# Patient Record
Sex: FEMALE | Race: WHITE
[De-identification: names, ages, dates, MRNs, and addresses within clinical notes are randomized per-mention and may not be internally consistent; named-entity substitution may affect disease eponyms.]

---

## 2021-07-19 ENCOUNTER — HOSPITAL ENCOUNTER (OUTPATIENT)
Dept: HOSPITAL 63 - RAD | Age: 46
End: 2021-07-19
Attending: PHYSICIAN ASSISTANT
Payer: COMMERCIAL

## 2021-07-19 DIAGNOSIS — R05: Primary | ICD-10-CM

## 2021-07-19 PROCEDURE — 71046 X-RAY EXAM CHEST 2 VIEWS: CPT

## 2021-07-19 NOTE — RAD
INDICATION: Shortness of breath



COMPARISON: None.



FINDINGS:



2 view of chest obtained.

No focal airspace consolidation.  

Cardiomediastinal contour unremarkable.



No acute osseous abnormality.





IMPRESSION:



*  No focal airspace consolidation or edema.



Electronically signed by: Gee Fairbanks MD (7/19/2021 2:31 PM) [id number]

## 2021-07-24 ENCOUNTER — HOSPITAL ENCOUNTER (EMERGENCY)
Dept: HOSPITAL 63 - ER | Age: 46
Discharge: ANOTHER HOSPITAL | End: 2021-07-24
Payer: COMMERCIAL

## 2021-07-24 VITALS — BODY MASS INDEX: 23.05 KG/M2 | WEIGHT: 130.07 LBS | HEIGHT: 63 IN

## 2021-07-24 VITALS — SYSTOLIC BLOOD PRESSURE: 153 MMHG | DIASTOLIC BLOOD PRESSURE: 95 MMHG

## 2021-07-24 DIAGNOSIS — I10: ICD-10-CM

## 2021-07-24 DIAGNOSIS — I21.3: Primary | ICD-10-CM

## 2021-07-24 LAB
ALBUMIN SERPL-MCNC: 3.9 G/DL (ref 3.4–5)
ALBUMIN/GLOB SERPL: 1.1 {RATIO} (ref 1–1.7)
ALP SERPL-CCNC: 95 U/L (ref 46–116)
ALT SERPL-CCNC: 49 U/L (ref 14–59)
ANION GAP SERPL CALC-SCNC: 13 MMOL/L (ref 6–14)
AST SERPL-CCNC: 36 U/L (ref 15–37)
BASOPHILS # BLD AUTO: 0 X10^3/UL (ref 0–0.2)
BASOPHILS NFR BLD: 1 % (ref 0–3)
BILIRUB SERPL-MCNC: 0.5 MG/DL (ref 0.2–1)
BUN/CREAT SERPL: 7 (ref 6–20)
CA-I SERPL ISE-MCNC: 6 MG/DL (ref 7–20)
CALCIUM SERPL-MCNC: 9.5 MG/DL (ref 8.5–10.1)
CHLORIDE SERPL-SCNC: 102 MMOL/L (ref 98–107)
CO2 SERPL-SCNC: 25 MMOL/L (ref 21–32)
CREAT SERPL-MCNC: 0.9 MG/DL (ref 0.6–1)
EOSINOPHIL NFR BLD: 0 X10^3/UL (ref 0–0.7)
EOSINOPHIL NFR BLD: 1 % (ref 0–3)
ERYTHROCYTE [DISTWIDTH] IN BLOOD BY AUTOMATED COUNT: 14 % (ref 11.5–14.5)
GFR SERPLBLD BASED ON 1.73 SQ M-ARVRAT: 68.3 ML/MIN
GLOBULIN SER-MCNC: 3.4 G/DL (ref 2.2–3.8)
GLUCOSE SERPL-MCNC: 145 MG/DL (ref 70–99)
HCT VFR BLD CALC: 49.6 % (ref 36–47)
HGB BLD-MCNC: 16.9 G/DL (ref 12–15.5)
LYMPHOCYTES # BLD: 1.8 X10^3/UL (ref 1–4.8)
LYMPHOCYTES NFR BLD AUTO: 44 % (ref 24–48)
MAGNESIUM SERPL-MCNC: 1.8 MG/DL (ref 1.8–2.4)
MCH RBC QN AUTO: 35 PG (ref 25–35)
MCHC RBC AUTO-ENTMCNC: 34 G/DL (ref 31–37)
MCV RBC AUTO: 101 FL (ref 79–100)
MONO #: 0.4 X10^3/UL (ref 0–1.1)
MONOCYTES NFR BLD: 10 % (ref 0–9)
NEUT #: 1.8 X10^3UL (ref 1.8–7.7)
NEUTROPHILS NFR BLD AUTO: 44 % (ref 31–73)
PLATELET # BLD AUTO: 136 X10^3/UL (ref 140–400)
POTASSIUM SERPL-SCNC: 3.9 MMOL/L (ref 3.5–5.1)
PROT SERPL-MCNC: 7.3 G/DL (ref 6.4–8.2)
RBC # BLD AUTO: 4.91 X10^6/UL (ref 3.5–5.4)
SODIUM SERPL-SCNC: 140 MMOL/L (ref 136–145)
WBC # BLD AUTO: 4 X10^3/UL (ref 4–11)

## 2021-07-24 PROCEDURE — 71045 X-RAY EXAM CHEST 1 VIEW: CPT

## 2021-07-24 PROCEDURE — 85730 THROMBOPLASTIN TIME PARTIAL: CPT

## 2021-07-24 PROCEDURE — 85610 PROTHROMBIN TIME: CPT

## 2021-07-24 PROCEDURE — 96372 THER/PROPH/DIAG INJ SC/IM: CPT

## 2021-07-24 PROCEDURE — 84484 ASSAY OF TROPONIN QUANT: CPT

## 2021-07-24 PROCEDURE — 83735 ASSAY OF MAGNESIUM: CPT

## 2021-07-24 PROCEDURE — 36415 COLL VENOUS BLD VENIPUNCTURE: CPT

## 2021-07-24 PROCEDURE — 80053 COMPREHEN METABOLIC PANEL: CPT

## 2021-07-24 PROCEDURE — 85025 COMPLETE CBC W/AUTO DIFF WBC: CPT

## 2021-07-24 PROCEDURE — 85379 FIBRIN DEGRADATION QUANT: CPT

## 2021-07-24 PROCEDURE — 99285 EMERGENCY DEPT VISIT HI MDM: CPT

## 2021-07-24 PROCEDURE — 83880 ASSAY OF NATRIURETIC PEPTIDE: CPT

## 2021-07-24 PROCEDURE — 93005 ELECTROCARDIOGRAM TRACING: CPT

## 2021-07-24 PROCEDURE — 96374 THER/PROPH/DIAG INJ IV PUSH: CPT

## 2021-07-24 NOTE — RAD
XR CHEST 1V



History: Reason: stemi / Spl. Instructions:  / History: 



Comparison: July 19, 2021



Findings:

No consolidation or pleural effusion. Normal heart size. No pneumothorax.



Impression: 

1.  No acute cardiopulmonary process.



Electronically signed by: Kareem Muller DO (7/24/2021 2:14 PM) [id number]

## 2021-07-24 NOTE — PHYS DOC
General Adult


EDM:


Chief Complaint:  CHEST PAIN





HPI:


HPI:





Patient is a  45-year-old female coming in for 1 hour of chest heaviness.  

Patient states she was sitting when it started.  Not had symptoms like this 

before.  Patient states she is a history of hypertension and is compliant on 

lisinopril.  Has a history of tobacco use, denies any alcohol or drugs.  Patient

tested positive for COVID-19 4 days ago, but has not been having any symptoms 

other than nonproductive cough for the past couple days.





Review of Systems:


Review of Systems:


All other systems within normal limits except for as noted in the HPI





Physical Exam:


PE:





Constitutional: Well developed, well nourished, no acute distress, non-toxic 

appearance. []


HENT: Normocephalic, atraumatic, bilateral external ears normal, oropharynx 

moist, no oral exudates, nose normal. []


Eyes: PERRLA, EOMI, conjunctiva normal, no discharge. [] 


Neck: Normal range of motion, no tenderness, supple, no stridor. [] 


Cardiovascular:Heart rate regular rhythm, no murmur []


Lungs & Thorax:  Bilateral breath sounds clear to auscultation []


Abdomen: Bowel sounds normal, soft, no tenderness, no masses, no pulsatile 

masses. [] 


Skin: Warm, dry, no erythema, no rash. [] 


Back: No tenderness, no CVA tenderness. [] 


Extremities: No tenderness, no cyanosis, no clubbing, ROM intact, no edema. [] 


Neurologic: Alert and oriented X 3, normal motor function, normal sensory 

function, no focal deficits noted. []


Psychologic: Affect normal, judgement normal, mood normal. []





Current Patient Data:


Labs:


Constitutional: Well developed, well nourished, no acute distress, non-toxic 

appearance. []


HENT: Normocephalic, atraumatic, bilateral external ears normal,  nose normal. 

[]


Eyes: PERRLA, conjunctiva normal, no discharge. [] 


Neck: No rigidity, supple, no stridor. [] 


Cardiovascular: Regular rate and rhythm, brisk cap refill []


Lungs & Thorax: Non labored symmetric respirations, no tachypnea or respiratory 

distress []


Abdomen: Soft, nondistended.


Skin: Warm, dry, no erythema, no rash. [] 


Back: Unremarkable


Extremities: No deformities, range of motion grossly intact, no lower extremity 

edema [] 


Neurologic: Alert and oriented X 3, no focal deficits noted. []


Psychologic: Affect normal, judgement normal, mood normal. []





EKG:


EK: ST elevation in in leads II, III, aVF, ST depression in 1 and aVL


1358: Continued ST elevation in inferior leads with depressions in 1 and aVL.  

No significant change from previous EKG []





Radiology/Procedures:


Radiology/Procedures:


[facility] [address], [place] [address] [phone]


                                        


                                 IMAGING REPORT





                                     Signed





PATIENT: JONATHON YOUNG ACCOUNT: [id number]     MRN#: [id number]


: 1975           LOCATION: ER              AGE: 45


SEX: F                    EXAM DT: 21         ACCESSION#: [id number]


STATUS: REG ER            ORD. PHYSICIAN: REY DESIR MD


REASON: stemi


PROCEDURE: PORTABLE CHEST 1V





XR CHEST 1V





History: Reason: stemi / Spl. Instructions:  / History: 





Comparison: 2021





Findings:


No consolidation or pleural effusion. Normal heart size. No pneumothorax.





Impression: 


1.  No acute cardiopulmonary process.





Electronically signed by: Kareem Muller DO (2021 2:14 PM) [id number]














DICTATED AND SIGNED BY:     KAREEM MULLER DO


DATE:     21 1414





CC: REY DESIR MD; JORDYN KIRK ~MTH0 0


[]





Heart Score:


C/O Chest Pain:  N/A


Risk Factors:


Risk Factors:  DM, Current or recent (<one month) smoker, HTN, HLP, family 

history of CAD, obesity.


Risk Scores:


Score 0 - 3:  2.5% MACE over next 6 weeks - Discharge Home


Score 4 - 6:  20.3% MACE over next 6 weeks - Admit for Clinical Observation


Score 7 - 10:  72.7% MACE over next 6 weeks - Early Invasive Strategies





Course & Med Decision Making:


Course & Med Decision Making


STEMI activation at 1336.  Discussed case with Dr. Severino who will take patient

 to Cath Lab. patient admitted to hospitalist, Dr. Dick.





Rory Disclaimer:


Dragon Disclaimer:


This electronic medical record was generated, in whole or in part, using a voice

 recognition dictation system.





Departure


Departure:


Impression:  


   Primary Impression:  


   STEMI (ST elevation myocardial infarction)


Disposition:  [facility]


Condition:  CRITICAL


Referrals:  


JORDYN KIRK (PCP)











REY DESIR MD            2021 13:55

## 2021-07-26 VITALS — DIASTOLIC BLOOD PRESSURE: 112 MMHG | SYSTOLIC BLOOD PRESSURE: 145 MMHG

## 2021-07-26 NOTE — EKG
[facility] [address], [place] [address]

Test Date:    2021               Test Time:    13:23:11

Pat Name:     JONATHON YOUNG           Department:   

Patient ID:   [id number]           Room:          

Gender:       F                        Technician:   IVAN

:          1975               Requested By: REY DESIR

Order Number: [id number]            Reading MD:     

                                 Measurements

Intervals                              Axis          

Rate:         68                       P:            27

WY:           130                      QRS:          63

QRSD:         82                       T:            78

QT:           400                                    

QTc:          426                                    

                           Interpretive Statements

SINUS RHYTHM

CONSIDER RIGHT VENTRICULAR HYPERTROPHY

POSSIBLY ABNORMAL ECG

RI6.02

No previous ECG available for comparison

## 2021-07-27 ENCOUNTER — HOSPITAL ENCOUNTER (EMERGENCY)
Dept: HOSPITAL 63 - ER | Age: 46
Discharge: HOME | End: 2021-07-27
Payer: COMMERCIAL

## 2021-07-27 VITALS — SYSTOLIC BLOOD PRESSURE: 117 MMHG | DIASTOLIC BLOOD PRESSURE: 80 MMHG

## 2021-07-27 VITALS — WEIGHT: 156.53 LBS | HEIGHT: 62.5 IN | BODY MASS INDEX: 28.08 KG/M2

## 2021-07-27 DIAGNOSIS — I25.2: ICD-10-CM

## 2021-07-27 DIAGNOSIS — U07.1: Primary | ICD-10-CM

## 2021-07-27 DIAGNOSIS — E83.42: ICD-10-CM

## 2021-07-27 DIAGNOSIS — Z98.890: ICD-10-CM

## 2021-07-27 DIAGNOSIS — E87.6: ICD-10-CM

## 2021-07-27 LAB
ALBUMIN SERPL-MCNC: 3.2 G/DL (ref 3.4–5)
ALBUMIN/GLOB SERPL: 0.9 {RATIO} (ref 1–1.7)
ALP SERPL-CCNC: 76 U/L (ref 46–116)
ALT SERPL-CCNC: 32 U/L (ref 14–59)
ANION GAP SERPL CALC-SCNC: 13 MMOL/L (ref 6–14)
APTT PPP: YELLOW S
AST SERPL-CCNC: 27 U/L (ref 15–37)
BACTERIA #/AREA URNS HPF: 0 /HPF
BASOPHILS # BLD AUTO: 0 X10^3/UL (ref 0–0.2)
BASOPHILS NFR BLD: 0 % (ref 0–3)
BILIRUB SERPL-MCNC: 0.5 MG/DL (ref 0.2–1)
BILIRUB UR QL STRIP: (no result)
BUN/CREAT SERPL: 10 (ref 6–20)
CA-I SERPL ISE-MCNC: 8 MG/DL (ref 7–20)
CALCIUM SERPL-MCNC: 8.5 MG/DL (ref 8.5–10.1)
CHLORIDE SERPL-SCNC: 105 MMOL/L (ref 98–107)
CO2 SERPL-SCNC: 22 MMOL/L (ref 21–32)
CREAT SERPL-MCNC: 0.8 MG/DL (ref 0.6–1)
EOSINOPHIL NFR BLD: 0 X10^3/UL (ref 0–0.7)
EOSINOPHIL NFR BLD: 1 % (ref 0–3)
ERYTHROCYTE [DISTWIDTH] IN BLOOD BY AUTOMATED COUNT: 13.8 % (ref 11.5–14.5)
FIBRINOGEN PPP-MCNC: CLEAR MG/DL
GFR SERPLBLD BASED ON 1.73 SQ M-ARVRAT: 78.3 ML/MIN
GLOBULIN SER-MCNC: 3.4 G/DL (ref 2.2–3.8)
GLUCOSE SERPL-MCNC: 92 MG/DL (ref 70–99)
GLUCOSE UR STRIP-MCNC: (no result) MG/DL
HCT VFR BLD CALC: 42.1 % (ref 36–47)
HGB BLD-MCNC: 14.5 G/DL (ref 12–15.5)
LYMPHOCYTES # BLD: 0.9 X10^3/UL (ref 1–4.8)
LYMPHOCYTES NFR BLD AUTO: 11 % (ref 24–48)
MAGNESIUM SERPL-MCNC: 1.7 MG/DL (ref 1.8–2.4)
MCH RBC QN AUTO: 35 PG (ref 25–35)
MCHC RBC AUTO-ENTMCNC: 34 G/DL (ref 31–37)
MCV RBC AUTO: 100 FL (ref 79–100)
MONO #: 0.6 X10^3/UL (ref 0–1.1)
MONOCYTES NFR BLD: 8 % (ref 0–9)
MONONUCLEOSIS PATIENT: NEGATIVE
NEUT #: 6.7 X10^3UL (ref 1.8–7.7)
NEUTROPHILS NFR BLD AUTO: 81 % (ref 31–73)
NITRITE UR QL STRIP: (no result)
PLATELET # BLD AUTO: 134 X10^3/UL (ref 140–400)
POTASSIUM SERPL-SCNC: 3.3 MMOL/L (ref 3.5–5.1)
PROT SERPL-MCNC: 6.6 G/DL (ref 6.4–8.2)
RBC # BLD AUTO: 4.19 X10^6/UL (ref 3.5–5.4)
RBC #/AREA URNS HPF: 0 /HPF (ref 0–2)
SODIUM SERPL-SCNC: 140 MMOL/L (ref 136–145)
SP GR UR STRIP: 1.02
SQUAMOUS #/AREA URNS LPF: (no result) /LPF
UROBILINOGEN UR-MCNC: 0.2 MG/DL
WBC # BLD AUTO: 8.3 X10^3/UL (ref 4–11)
WBC #/AREA URNS HPF: 0 /HPF (ref 0–4)

## 2021-07-27 PROCEDURE — 93005 ELECTROCARDIOGRAM TRACING: CPT

## 2021-07-27 PROCEDURE — 82553 CREATINE MB FRACTION: CPT

## 2021-07-27 PROCEDURE — 80053 COMPREHEN METABOLIC PANEL: CPT

## 2021-07-27 PROCEDURE — 83735 ASSAY OF MAGNESIUM: CPT

## 2021-07-27 PROCEDURE — 96360 HYDRATION IV INFUSION INIT: CPT

## 2021-07-27 PROCEDURE — 81001 URINALYSIS AUTO W/SCOPE: CPT

## 2021-07-27 PROCEDURE — 83605 ASSAY OF LACTIC ACID: CPT

## 2021-07-27 PROCEDURE — 85651 RBC SED RATE NONAUTOMATED: CPT

## 2021-07-27 PROCEDURE — 96361 HYDRATE IV INFUSION ADD-ON: CPT

## 2021-07-27 PROCEDURE — 85025 COMPLETE CBC W/AUTO DIFF WBC: CPT

## 2021-07-27 PROCEDURE — 36415 COLL VENOUS BLD VENIPUNCTURE: CPT

## 2021-07-27 PROCEDURE — 87040 BLOOD CULTURE FOR BACTERIA: CPT

## 2021-07-27 PROCEDURE — 86308 HETEROPHILE ANTIBODY SCREEN: CPT

## 2021-07-27 PROCEDURE — 71045 X-RAY EXAM CHEST 1 VIEW: CPT

## 2021-07-27 PROCEDURE — U0003 INFECTIOUS AGENT DETECTION BY NUCLEIC ACID (DNA OR RNA); SEVERE ACUTE RESPIRATORY SYNDROME CORONAVIRUS 2 (SARS-COV-2) (CORONAVIRUS DISEASE [COVID-19]), AMPLIFIED PROBE TECHNIQUE, MAKING USE OF HIGH THROUGHPUT TECHNOLOGIES AS DESCRIBED BY CMS-2020-01-R: HCPCS

## 2021-07-27 PROCEDURE — 84484 ASSAY OF TROPONIN QUANT: CPT

## 2021-07-27 PROCEDURE — 99285 EMERGENCY DEPT VISIT HI MDM: CPT

## 2021-07-27 PROCEDURE — 86140 C-REACTIVE PROTEIN: CPT

## 2021-07-27 NOTE — RAD
Chest AP portable at 1919:



Reason for examination: Fever.



Comparison is made to previous study dated 7/24/2021.



The heart size is normal. Mediastinum is unremarkable. Lung fields are clear. No acute bony abnormali
ties are seen.



Impression:



No acute cardiopulmonary disease.



Electronically signed by: Nayla Lee MD (7/27/2021 8:03 PM) [email or web address]

## 2021-07-27 NOTE — PHYS DOC
Past History


Past Surgical History:  





General Adult


EDM:


Chief Complaint:  FEVER





HPI:


HPI:





Patient is a [age] year old [sex] who presents with []





Review of Systems:


Review of Systems:


Constitutional:  Denies fever or chills 


Eyes:  Denies change in visual acuity 


HENT:  Denies nasal congestion or sore throat 


Respiratory:  Denies cough or shortness of breath 


Cardiovascular:  Denies chest pain or edema 


GI:  Denies abdominal pain, nausea, vomiting, bloody stools or diarrhea 


: Denies dysuria 


Musculoskeletal:  Denies back pain or joint pain 


Integument:  Denies rash 


Neurologic:  Denies headache, focal weakness or sensory changes 


Endocrine:  Denies polyuria or polydipsia 


Lymphatic:  Denies swollen glands 


Psychiatric:  Denies depression or anxiety





Current Medications:


Current Meds:





Current Medications








 Medications


  (Trade)  Dose


 Ordered  Sig/Teodoro  Start Time


 Stop Time Status Last Admin


Dose Admin


 


 Acetaminophen


  (Tylenol)  500 mg  1X  ONCE  21 19:15


 21 19:16 DC 21 19:32


500 MG


 


 Sodium Chloride  1,000 ml @ 


 1,000 mls/hr  1X  ONCE  21 19:15


 21 20:14  21 19:43


1,000 MLS/HR











Allergies:


Allergies:





Allergies








Coded Allergies Type Severity Reaction Last Updated Verified


 


  No Known Drug Allergies    21 No











Physical Exam:


PE:





Constitutional: Well developed, well nourished, no acute distress, non-toxic 

appearance. []


HENT: Normocephalic, atraumatic, bilateral external ears normal, oropharynx 

moist, no oral exudates, nose normal. []


Eyes: PERRLA, EOMI, conjunctiva normal, no discharge. [] 


Neck: Normal range of motion, no tenderness, supple, no stridor. [] 


Cardiovascular:Heart rate regular rhythm, no murmur []


Lungs & Thorax:  Bilateral breath sounds clear to auscultation []


Abdomen: Bowel sounds normal, soft, no tenderness, no masses, no pulsatile m

asses. [] 


Skin: Warm, dry, no erythema, no rash. [] 


Back: No tenderness, no CVA tenderness. [] 


Extremities: No tenderness, no cyanosis, no clubbing, ROM intact, no edema. [] 


Neurologic: Alert and oriented X 3, normal motor function, normal sensory 

function, no focal deficits noted. []


Psychologic: Affect normal, judgement normal, mood normal. []





EKG:


EKG:


@1927 NSR at 97bpm, NO ST elevation, QRS 78ms, QT/QTc 334/428ms, t wave 

inversion III





Radiology/Procedures:


Radiology/Procedures:


PROCEDURE: CHEST AP ONLY





Chest AP portable at 1919:





Reason for examination: Fever.





Comparison is made to previous study dated 2021.





The heart size is normal. Mediastinum is unremarkable. Lung fields are clear. No

 acute bony abnormalities are seen.





Impression:





No acute cardiopulmonary disease.





Electronically signed by: Nayla Lee MD (2021 8:03 PM) MIRIAM-CHOW





Heart Score:


C/O Chest Pain:  N/A





Course & Med Decision Making:


Course & Med Decision Making


Pertinent Labs and Imaging studies reviewed. (See chart for details)





[]





Dragon Disclaimer:


Dragon Disclaimer:


This electronic medical record was generated, in whole or in part, using a voice

 recognition dictation system.





Departure


Departure:


Impression:  


   Primary Impression:  


   Fever


   Qualified Codes:  R50.9 - Fever, unspecified


   Additional Impressions:  


   Suspected 2019 novel coronavirus infection


   History of ST elevation myocardial infarction (STEMI)


   Hypokalemia


   Hypomagnesemia


Disposition:   HOME / SELF CARE / HOMELESS


Condition:  STABLE


Referrals:  


JORDYN KIRK (PCP)








RUBIO CLEMENTS MD


Patient Instructions:  Fever, Adult, Easy-to-Read, Hypokalemia, Hypomagnesemia, 

Potassium Content of Foods





Additional Instructions:  


You have been tested for or diagnosed with COVID-19. It is an infection caused 

by a new type 





of coronavirus. COVID-19 will cause cold-like or mild flu symptoms in most. It 

can cause 


more severe symptoms like problems breathing in some.





There is no treatment for COVID-19. The body will clear the infection over time.

 Self-care 


will help to ease discomfort.





Steps to Take:


Self-Care


Rest as needed. Healthy habits may help you feel better. Steps include:





Choose healthy foods including fruits and vegetables. Drink water throughout the

 day.


Get plenty of sleep each night.


If you smoke, try to quit. It may ease breathing.


Avoid alcohol.


Keep Others Healthy


The virus can spread to others. Droplets are released every time you sneeze or 

cough. The 


droplets can get into the mouth, nose, or eyes of people near you and lead to 

infection. To 


lower the chances of spreading COVID-19 to others:





Stay at home until your doctor has said it is safe to leave. If you tested 

positive this 


will mean staying isolated until both of the following are true:





At least 7 days have passed since the start of illness.


You are free of fever for at least 72 hours without the use of medicine.


During this time:





 - Avoid public areas, events, or transportation. Do not return to work or 

school until your 





doctor has said it is safe to do so.


 - Call ahead if you need to go to a medical center. Let them know you may have 

COVID-19. It 





will help them guide you where to go. They may also ask you to wear a facemask 

when you come 





to the office.


 - If you call for emergency medical services, let them know you may have COVID-

19.


While at home:





 - Try to avoid close contact with others. Stay about 6 feet away.


 - If possible, spend most of your time in a separate room from others.


 - Use a face mask if you will be in close contact with others such as sharing a

 room or 


vehicle.


 - Have someone wipe down common surfaces in the home. Use household  

every day on 


areas like doorknobs, counters, or sinks.


 - Cough or sneeze into a tissue. Throw the tissue away right after use. If a 

tissue is not 


available, cough or sneeze into your elbow.


 - Wash your hands often. Wash them after sneezing or coughing. Use soap and 

water and wash 


for at least 20 seconds. Alcohol based hand  can be used if soap and 

water is not 


available.


 - Do not prepare food for others. Avoid sharing personal items like forks, 

spoons, or 


toothbrushes.


 - Avoid close contact with pets while you are sick. There is no evidence of the

 virus 


passing to pets. This is a safety step until more is known about this virus.


Isolation can be frustrating. Social interaction can help. Keep in touch with 

friends and 


family through phone and tech options. You can still interact with others in 

your home, just 





keep a safe distance of about 6 feet.





Follow-up:


Your doctors office will check in with you to see if there are any changes in 

your health. 


You may be asked to keep track of symptoms to share with them. They will also 

let you know 


when you are clear to be in public again.





Problems to Look Out For:


Contact your doctor if your recovery is not going as you expect. Get emergency 

care if you 


have problems such as:





 - Trouble breathing


 - Nonstop chest pain or pressure


 - Changes in awareness, confusion, or problems waking


 - Lips or face have bluish color


 - Worsening of symptoms


If you think you have an emergency, call for emergency medical services right 

away.





As taken from [facility]











NOMAN PALAFOX DO             2021 20:00

## 2021-07-27 NOTE — EKG
[facility] [address], [place] [address]

Test Date:    2021               Test Time:    19:27:21

Pat Name:     JONATHON YOUNG           Department:   

Patient ID:   [id number]           Room:          

Gender:       F                        Technician:   

:          1975               Requested By: NOMAN PALAFOX

Order Number: [id number]            Reading MD:     

                                 Measurements

Intervals                              Axis          

Rate:         97                       P:            25

NJ:           116                      QRS:          9

QRSD:         78                       T:            -1

QT:           334                                    

QTc:          428                                    

                           Interpretive Statements

SINUS RHYTHM

NO SPECIFIC ECG ABNORMALITIES

RI6.02

No previous ECG available for comparison

## 2021-07-30 NOTE — NUR
IP: Informed pt fo positive covid test and the need to quarantine for 10 days. Pt verbalized 
understanding.

## 2021-09-23 ENCOUNTER — HOSPITAL ENCOUNTER (EMERGENCY)
Dept: HOSPITAL 63 - ER | Age: 46
Discharge: HOME | End: 2021-09-23
Payer: COMMERCIAL

## 2021-09-23 VITALS
SYSTOLIC BLOOD PRESSURE: 149 MMHG | DIASTOLIC BLOOD PRESSURE: 58 MMHG | DIASTOLIC BLOOD PRESSURE: 58 MMHG | SYSTOLIC BLOOD PRESSURE: 149 MMHG

## 2021-09-23 VITALS — HEIGHT: 62.5 IN | BODY MASS INDEX: 28.28 KG/M2 | WEIGHT: 157.63 LBS

## 2021-09-23 DIAGNOSIS — K21.9: ICD-10-CM

## 2021-09-23 DIAGNOSIS — I10: ICD-10-CM

## 2021-09-23 DIAGNOSIS — F17.210: ICD-10-CM

## 2021-09-23 DIAGNOSIS — F41.9: ICD-10-CM

## 2021-09-23 DIAGNOSIS — R07.89: Primary | ICD-10-CM

## 2021-09-23 LAB
ALBUMIN SERPL-MCNC: 4.1 G/DL (ref 3.4–5)
ALBUMIN/GLOB SERPL: 1.2 {RATIO} (ref 1–1.7)
ALP SERPL-CCNC: 81 U/L (ref 46–116)
ALT SERPL-CCNC: 35 U/L (ref 14–59)
ANION GAP SERPL CALC-SCNC: 14 MMOL/L (ref 6–14)
AST SERPL-CCNC: 30 U/L (ref 15–37)
BASOPHILS # BLD AUTO: 0 X10^3/UL (ref 0–0.2)
BASOPHILS NFR BLD: 1 % (ref 0–3)
BILIRUB SERPL-MCNC: 0.5 MG/DL (ref 0.2–1)
BUN/CREAT SERPL: 12 (ref 6–20)
CA-I SERPL ISE-MCNC: 7 MG/DL (ref 7–20)
CALCIUM SERPL-MCNC: 9.2 MG/DL (ref 8.5–10.1)
CHLORIDE SERPL-SCNC: 102 MMOL/L (ref 98–107)
CO2 SERPL-SCNC: 23 MMOL/L (ref 21–32)
CREAT SERPL-MCNC: 0.6 MG/DL (ref 0.6–1)
EOSINOPHIL NFR BLD: 0.1 X10^3/UL (ref 0–0.7)
EOSINOPHIL NFR BLD: 1 % (ref 0–3)
ERYTHROCYTE [DISTWIDTH] IN BLOOD BY AUTOMATED COUNT: 13.9 % (ref 11.5–14.5)
GFR SERPLBLD BASED ON 1.73 SQ M-ARVRAT: 108.6 ML/MIN
GLOBULIN SER-MCNC: 3.5 G/DL (ref 2.2–3.8)
GLUCOSE SERPL-MCNC: 83 MG/DL (ref 70–99)
HCT VFR BLD CALC: 43.1 % (ref 36–47)
HGB BLD-MCNC: 14.5 G/DL (ref 12–15.5)
LYMPHOCYTES # BLD: 1.7 X10^3/UL (ref 1–4.8)
LYMPHOCYTES NFR BLD AUTO: 25 % (ref 24–48)
MCH RBC QN AUTO: 34 PG (ref 25–35)
MCHC RBC AUTO-ENTMCNC: 34 G/DL (ref 31–37)
MCV RBC AUTO: 101 FL (ref 79–100)
MONO #: 0.5 X10^3/UL (ref 0–1.1)
MONOCYTES NFR BLD: 7 % (ref 0–9)
NEUT #: 4.5 X10^3UL (ref 1.8–7.7)
NEUTROPHILS NFR BLD AUTO: 66 % (ref 31–73)
PLATELET # BLD AUTO: 200 X10^3/UL (ref 140–400)
POTASSIUM SERPL-SCNC: 3.8 MMOL/L (ref 3.5–5.1)
PROT SERPL-MCNC: 7.6 G/DL (ref 6.4–8.2)
RBC # BLD AUTO: 4.28 X10^6/UL (ref 3.5–5.4)
SODIUM SERPL-SCNC: 139 MMOL/L (ref 136–145)
WBC # BLD AUTO: 6.8 X10^3/UL (ref 4–11)

## 2021-09-23 PROCEDURE — 71045 X-RAY EXAM CHEST 1 VIEW: CPT

## 2021-09-23 PROCEDURE — 80053 COMPREHEN METABOLIC PANEL: CPT

## 2021-09-23 PROCEDURE — 85610 PROTHROMBIN TIME: CPT

## 2021-09-23 PROCEDURE — 85730 THROMBOPLASTIN TIME PARTIAL: CPT

## 2021-09-23 PROCEDURE — 84484 ASSAY OF TROPONIN QUANT: CPT

## 2021-09-23 PROCEDURE — 93005 ELECTROCARDIOGRAM TRACING: CPT

## 2021-09-23 PROCEDURE — 85025 COMPLETE CBC W/AUTO DIFF WBC: CPT

## 2021-09-23 PROCEDURE — 36415 COLL VENOUS BLD VENIPUNCTURE: CPT

## 2021-09-23 NOTE — EKG
[facility] [address], [place] [address]

Test Date:    2021               Test Time:    11:19:50

Pat Name:     JONATHON YOUNG           Department:   

Patient ID:   [id number]           Room:          

Gender:       F                        Technician:   IVAN

:          1975               Requested By: GILES PEDROZA

Order Number: [id number]            Reading MD:     

                                 Measurements

Intervals                              Axis          

Rate:         89                       P:            32

SC:           136                      QRS:          33

QRSD:         78                       T:            24

QT:           364                                    

QTc:          444                                    

                           Interpretive Statements

SINUS RHYTHM

NORMAL ECG

RI6.02

No previous ECG available for comparison

## 2021-09-23 NOTE — PHYS DOC
Past History


Past Medical History:  CAD, Hypertension, MI


Past Surgical History:  , Other


Additional Past Surgical Histo:  2 stents placed


Smoking:  Cigarettes


Alcohol Use:  None


Drug Use:  None





General Adult


EDM:


Chief Complaint:  CHEST PAIN





HPI:


HPI:





Patient is a 46-year-old female comes in with left-sided chest pain that started

last night.  Patient describes pain as dull, constant that radiated into her nec

k.  Patient was seen here a few months ago with an MI and had 2 stents placed.  

Patient states "I do not know if anything is wrong but I have been anxious 

because of what happened a few months ago".  Patient denies shortness of breath,

dizziness, nausea/vomiting.  Patient states that she has a family history on 

maternal and paternal grandparents side.  Paternal grandfather  at age 47 of

MI.  Patient has history of hypertension, smoking, MI, hyperlipidemia.





Review of Systems:


Review of Systems:


ROS


At least 10 ROS systems have been reviewed and are negative except as documented

in the HPI.


General: Negative except as outlined in HPI above.


Skin: Negative except as outlined in HPI above.


HEENT: Negative except as outlined in HPI above.


Neck: Negative except as outlined in HPI above.


Respiratory: Negative except as outlined in HPI above..


Cardiovascular: Negative except as outlined in HPI above.


Abdomen: Negative except as outlined in HPI above.


: Negative except as outlined in HPI above.


Back/MSK: Negative except as outlined in HPI above.


Neuro: Negative except as outlined in HPI above.


Psych: Negative except as outlined in HPI above.





Allergies:


Allergies:





Allergies








Coded Allergies Type Severity Reaction Last Updated Verified


 


  No Known Drug Allergies    21 No











Physical Exam:


PE:





Constitutional: Well developed, well nourished, no acute distress, non-toxic 

appearance. []


HENT: Normocephalic, atraumatic, bilateral external ears normal, oropharynx 

moist, no oral exudates, nose normal. []


Eyes: PERRLA, EOMI, conjunctiva normal, no discharge. [] 


Neck: Normal range of motion, no tenderness, supple, no stridor. [] 


Cardiovascular:Heart rate regular rhythm, no murmur []


Lungs & Thorax:  Bilateral breath sounds clear to auscultation []


Abdomen: Bowel sounds normal, soft, no tenderness, no masses, no pulsatile 

masses. [] 


Skin: Warm, dry, no erythema, no rash. [] 


Back: No tenderness, no CVA tenderness. [] 


Extremities: No tenderness, no cyanosis, no clubbing, ROM intact, no edema. [] 


Neurologic: Alert and oriented X 3, normal motor function, normal sensory 

function, no focal deficits noted. []


Psychologic: Affect normal, judgement normal, mood normal. []





Current Patient Data:


Vital Signs:





                                   Vital Signs








  Date Time  Temp Pulse Resp B/P (MAP) Pulse Ox O2 Delivery O2 Flow Rate FiO2


 


21 11:32  70 18 149/58 (88) 99   











EKG:


EKG:


[] Sinus rhythm.  Heart rate 89 bpm.  Read by Dr. Burton.





Radiology/Procedures:


Radiology/Procedures:


[]


XR CHEST 1V





History: Reason: CP / Spl. Instructions:  / History: 





Comparison: 2021





Findings:


No consolidation or pleural effusion. Normal heart size. No pneumothorax.





Impression: 


1.  No acute cardiopulmonary process.





Electronically signed by: Kareem Muller DO (2021 12:11 PM) [id number]





Heart Score:


C/O Chest Pain:  Yes


HEART Score for Chest Pain:  








HEART Score for Chest Pain Response (Comments) Value


 


History Moderately Suspicious 1


 


ECG Normal 0


 


Age < 45 0


 


Risk Factors >3 Risk Factors or Hx CAD 2


 


Troponin < Normal Limit 0


 


Total  3








Risk Factors:


Risk Factors:  DM, Current or recent (<one month) smoker, HTN, HLP, family 

history of CAD, obesity.


Risk Scores:


Score 0 - 3:  2.5% MACE over next 6 weeks - Discharge Home


Score 4 - 6:  20.3% MACE over next 6 weeks - Admit for Clinical Observation


Score 7 - 10:  72.7% MACE over next 6 weeks - Early Invasive Strategies





Course & Med Decision Making:


Course & Med Decision Making


Pertinent Labs and Imaging studies reviewed. (See chart for details)





[] 46-year-old female presents with left-sided chest pain that started last 

night.  Patient reports pain radiated into her neck.  Patient has history of MI 

and 2 stents placed a few months ago.  Patient is currently a patient of Dr. Albarran.  Patient states she is concerned she is just having anxiety but was 

more concerned today when pain continued.





EKG shows sinus rhythm.  All labs unremarkable.  Chest x-ray is unremarkable.  

Troponin is negative.  Heart score of 3.


Discussed results with patient.  Patient most likely was having acid reflux and 

also anxiety due to past, recent history of MI.  Advised patient to call PCP 

make a follow-up appointment along with Dr. Albarran to be evaluated further.  

Patient is appreciative and okay with discharge plan.  Patient given strict 

return precautions.  Hemodynamically stable upon disposition.





Dragon Disclaimer:


Dragon Disclaimer:


This electronic medical record was generated, in whole or in part, using a voice

 recognition dictation system.





Departure


Departure:


Impression:  


   Primary Impression:  


   Chest pain


   Qualified Codes:  R07.9 - Chest pain, unspecified


   Additional Impressions:  


   GERD (gastroesophageal reflux disease)


   Qualified Codes:  K21.9 - Gastro-esophageal reflux disease without 

   esophagitis


   Anxiety


Disposition:   HOME / SELF CARE / HOMELESS


Condition:  STABLE


Referrals:  


JORDYN KIRK (PCP)


Patient Instructions:  Chest Pain (Nonspecific), Easy-to-Read





Additional Instructions:  


You are seen in the emergency room for left-sided chest pain.  All of your labs 

and chest x-ray were unremarkable.  EKG was unremarkable.  Please call your PCP 

make a follow-up appointment also called Dr. Albarran for follow-up as well.  

Return to the emergency room if you have worsening symptoms or concerns.





EMERGENCY DEPARTMENT GENERAL DISCHARGE INSTRUCTIONS





Thank you for coming to [facility] (ED) today and trusting us

 with you 


care.  We trust that you had a positivie experience in our Emergency Department.

  If you 


wish to speak to the department management, you may call the director at 

[phone].





YOUR FOLLOW UP INSTRUCTIONS ARE AS FOLLOWS:





1.  Do you have a private Doctor?  If you do not have a private doctor, please 

ask for a 


resource list of physicians or clinics that may be able to assist you with 

follow up care.





2.  The Emergency Physician has interpreted your x-rays.  The X-Ray specialist 

will also 


review them.  If there is a change in the findings, you will be notified in 48 

hours when at 


all possible.





3.  A lab test or culture has been done, your results will be reviewed and you 

will be 


notified if you need a change in treatment.





ADDITIONAL INSTRUCTIONS AND INFORMATION:





1.  Your care today has been supervised by a physician who is specially trained 

in emergency 


care.  Many problems require more than one evaluation for a complete diagnosis 

and 


treatment.  We recommend that you schedule your follow up appointment as 

recommended to 


ensure complete treatment of you illness or injury.  If you are unable to obtain

 follow up 


care and continue to have a problem, or if your condition worsens, we recommend 

that you 


return to the ED.





2.  We are not able to safely determine your condition over the phone nor are we

 able to 


give sound medical advice over the phone.  For these safety reasons, if you call

 for medical 


advice we will ask you to come to the ED for further evaluation.





3.  If you have any questions regarding these discharge instructions please call

 the ED at 


[phone].





SAFETY INFORMATION:





In the interest of safety, wellness, and injury prevention; we encourage you to 

wear your 


sealbelt, if you smoke; quite smoking, and we encourage family to use a 

protective helmet 


for bicycling and other sporting events that present an increased risk for head 

injury.





IF YOUR SYMPTOMS WORSEN OR NEW SYMPTOMS DEVELOP, OR YOU HAVE CONCERNS ABOUT YOUR

 CONDITION; 


OR IF YOUR CONDITION WORSENS WHILE YOU ARE WAITING FOR YOUR FOLLOW UP 

APPOINTMENT; EITHER 


CONTACT YOUR PRIMARY CARE DOCTOR, THE PHYSICIAN WHOSE NAME AND NUMBER YOU WERE 

GIVEN, OR 


RETURN TO THE ED IMMEDIATELY.











GILES PEDROZA               Sep 23, 2021 12:13

## 2021-09-23 NOTE — RAD
XR CHEST 1V



History: Reason: CP / Spl. Instructions:  / History: 



Comparison: July 27, 2021



Findings:

No consolidation or pleural effusion. Normal heart size. No pneumothorax.



Impression: 

1.  No acute cardiopulmonary process.



Electronically signed by: Kareem Muller DO (9/23/2021 12:11 PM) [id number]

## 2021-12-20 ENCOUNTER — HOSPITAL ENCOUNTER (OUTPATIENT)
Dept: HOSPITAL 61 - ECHO | Age: 46
End: 2021-12-20
Attending: INTERNAL MEDICINE
Payer: COMMERCIAL

## 2021-12-20 DIAGNOSIS — I34.0: Primary | ICD-10-CM

## 2021-12-20 DIAGNOSIS — I25.10: ICD-10-CM

## 2021-12-20 DIAGNOSIS — I21.11: ICD-10-CM

## 2021-12-20 PROCEDURE — 93306 TTE W/DOPPLER COMPLETE: CPT

## 2021-12-20 NOTE — CARD
MR#: [id number]

Account#: [id number]

Accession#: [id number]

Date of Study: 12/20/2021

Ordering Physician: LUX HERNANDEZ, 

Referring Physician: LUX HERNANDEZ, 

Tech: Terra Wolfe [facility]





--------------- APPROVED REPORT --------------





EXAM: Two-dimensional and M-mode echocardiogram with Doppler and color Doppler.



Other Information 

Quality : AverageHR: 89bpm

Rhythm : NSR



INDICATION

Cardiac Disease: CAD 



RISK FACTORS

Hypertension 

Hyperlipidemia



2D DIMENSIONS 

Left Atrium(2D)3.6 (1.6-4.0cm)IVSd0.8 (0.7-1.1cm)

Aortic Root(2D)2.9 (2.0-3.7cm)LVDd4.4 (3.9-5.9cm)

LVOT Diameter1.9 (1.8-2.4cm)PWd0.8 (0.7-1.1cm)

LVDs2.6 (2.5-4.0cm)FS (%) 40.3 %

SV61.5 ml



Aortic Valve

AoV Peak Matt.144.8cm/sAoV VTI28.4cm

AO Peak GR.8.4mmHgLVOT Peak Matt.115.1cm/s

AO Mean GR.4mmHgAVA (VMAX)2.36cm2



Mitral Valve

MV E Faeungut317.7cm/sMV DECEL UVQD457ip

MV A Bxntdlzu74.0cm/sE/A  Ratio1.1



Pulmonary Valve

PV Peak Zthgkwqh708.5cm/s



 LEFT VENTRICLE 

The left ventricle is normal size. There is normal left ventricular wall thickness. The left ventricu
lar systolic function is normal and the ejection fraction is within normal range.  LV ejection fracti
on of 60%. There is normal LV segmental wall motion. The left ventricular diastolic function and fill
ing is normal for age.



 RIGHT VENTRICLE 

The right ventricle is normal size. There is normal right ventricular wall thickness. The right ventr
icular systolic function is normal.



 ATRIA 

The left atrium size is normal. The right atrium size is normal. The interatrial septum is intact wit
h no evidence for an atrial septal defect or patent foramen ovale as noted on 2-D or Doppler imaging.




 AORTIC VALVE 

The aortic valve is normal in structure and function. Doppler and Color Flow revealed no significant 
aortic regurgitation. There is no significant aortic valvular stenosis.



 MITRAL VALVE 

The mitral valve is normal in structure and function. There is no evidence of mitral valve prolapse. 
There is no mitral valve stenosis. Doppler and Color-flow revealed mild mitral regurgitation.



 TRICUSPID VALVE 

The tricuspid valve is normal in structure and function. Doppler and Color Flow revealed no tricuspid
 valve regurgitation noted. There is no tricuspid valve stenosis.



 PULMONIC VALVE 

The pulmonary valve is normal in structure and function. Doppler and Color Flow revealed no pulmonic 
valvular regurgitation.



 GREAT VESSELS 

The aortic root is normal in size. The ascending aorta is normal in size. The IVC is normal in size a
nd collapses >50% with inspiration.



 PERICARDIAL EFFUSION 

There is no evidence of significant pericardial effusion.



Critical Notification

Critical Value: No



<Conclusion>

The left ventricle is normal size.

The left ventricular systolic function is normal and the ejection fraction is within normal range.  

LV ejection fraction of 60%.

There is normal LV segmental wall motion.

Doppler and Color Flow revealed no significant aortic regurgitation.

There is no significant aortic valvular stenosis.

Doppler and Color-flow revealed mild mitral regurgitation.

Doppler and Color Flow revealed no tricuspid valve regurgitation noted.



Signed by : Lux Hernandez MD

Electronically Approved : 12/20/2021 16:03:50

## 2022-04-05 ENCOUNTER — HOSPITAL ENCOUNTER (OUTPATIENT)
Dept: HOSPITAL 63 - LAB | Age: 47
End: 2022-04-05
Payer: COMMERCIAL

## 2022-04-05 DIAGNOSIS — R10.13: Primary | ICD-10-CM

## 2022-04-05 PROCEDURE — 36415 COLL VENOUS BLD VENIPUNCTURE: CPT

## 2022-04-05 PROCEDURE — 83516 IMMUNOASSAY NONANTIBODY: CPT

## 2022-04-07 LAB
GLIA IGA: 4 UNITS (ref 0–19)
GLIA IGG: 2 UNITS (ref 0–19)
TRANSGLUTAMINASE IGG AB: <2 U/ML (ref 0–5)
TTG IGA SER-ACNC: <2 U/ML (ref 0–3)

## 2022-05-20 ENCOUNTER — HOSPITAL ENCOUNTER (OUTPATIENT)
Dept: HOSPITAL 61 - ENDOS | Age: 47
Discharge: HOME | End: 2022-05-20
Attending: INTERNAL MEDICINE
Payer: COMMERCIAL

## 2022-05-20 VITALS — SYSTOLIC BLOOD PRESSURE: 136 MMHG | DIASTOLIC BLOOD PRESSURE: 84 MMHG

## 2022-05-20 VITALS — SYSTOLIC BLOOD PRESSURE: 127 MMHG | DIASTOLIC BLOOD PRESSURE: 92 MMHG

## 2022-05-20 VITALS — HEIGHT: 63 IN | BODY MASS INDEX: 27.73 KG/M2 | WEIGHT: 156.53 LBS

## 2022-05-20 DIAGNOSIS — Z98.890: ICD-10-CM

## 2022-05-20 DIAGNOSIS — R10.13: ICD-10-CM

## 2022-05-20 DIAGNOSIS — D12.5: ICD-10-CM

## 2022-05-20 DIAGNOSIS — K52.9: Primary | ICD-10-CM

## 2022-05-20 DIAGNOSIS — I10: ICD-10-CM

## 2022-05-20 DIAGNOSIS — Z98.51: ICD-10-CM

## 2022-05-20 DIAGNOSIS — K63.89: ICD-10-CM

## 2022-05-20 DIAGNOSIS — E78.00: ICD-10-CM

## 2022-05-20 DIAGNOSIS — Z79.899: ICD-10-CM

## 2022-05-20 DIAGNOSIS — F41.9: ICD-10-CM

## 2022-05-20 DIAGNOSIS — R19.4: ICD-10-CM

## 2022-05-20 DIAGNOSIS — K31.89: ICD-10-CM

## 2022-05-20 PROCEDURE — 43239 EGD BIOPSY SINGLE/MULTIPLE: CPT

## 2022-05-20 PROCEDURE — 45380 COLONOSCOPY AND BIOPSY: CPT

## 2022-05-20 PROCEDURE — 88305 TISSUE EXAM BY PATHOLOGIST: CPT

## 2022-05-20 PROCEDURE — 81025 URINE PREGNANCY TEST: CPT

## 2022-05-20 PROCEDURE — 45385 COLONOSCOPY W/LESION REMOVAL: CPT

## 2022-05-20 NOTE — PDOC2
CONSULT


Date of Consult


Date of Consult


DATE: 5/20/22 


TIME: 08:35





Reason for Consult


Reason for Consult:


Epigastric abd pain/change in bowel habits





History of Present Illness


Reason for Visit:


46 year old Female is seen with epigastric abdominal pain and change in bowel 

habits. Increased gurgling is noted with and without meals. No raphael ein weight 

is present despite symptoms. No family history of IBD or celiac is present. No 

OTC remedies have helped and No particular foods are more problematic. No 

bleeding is present. With the continued issues, she requests further evaluation.





Past Medical History


Cardiovascular:  CAD, HTN, Hyperlipidemia





Past Surgical History


Past Surgical History:  Tubal Ligation, No pertinent history





Family History


Family History:  Heart Disease, Hypertension





Social History


ALCOHOL:  none


Drugs:  None





Current Medications


Current Medications





Current Medications


Fentanyl Citrate (Fentanyl 2ml Vial) 50 mcg PRN Q5MIN  PRN IVP MODERATE PAIN 4-

6;  Start 5/20/22 at 06:00;  Stop 5/21/22 at 05:59


Ringer's Solution 1,000 ml @  30 mls/hr Q24H IV ;  Start 5/20/22 at 06:00;  Stop

5/20/22 at 17:59


Propofol (Diprivan) 200 mg STK-MED ONCE IV ;  Start 5/20/22 at 06:31;  Stop 

5/20/22 at 06:32;  Status DC





Active Scripts


Active


Aspirin Ec (Aspirin) 81 Mg Tablet.dr 81 Mg PO DAILYWBKFT 30 Days


Lisinopril 5 Mg Tablet 5 Mg PO DAILY 30 Days


Metoprolol Tartrate 25 Mg Tablet 12.5 Mg PO BID 30 Days


Atorvastatin Calcium 20 Mg Tablet 20 Mg PO QHS 30 Days


Brilinta (Ticagrelor) 90 Mg Tablet 90 Mg PO BID 30 Days


Reported


Xanax (Alprazolam) 0.5 Mg Tablet 1 Tab PO PRN BID PRN





Allergies


Allergies:  


Coded Allergies:  


     No Known Drug Allergies (Unverified , 7/24/21)





ROS


Gastrointestinal:  Yes Abdominal Pain





Physical Exam


General:  Alert, Oriented X3


Lungs:  Clear to auscultation


Heart:  Normal S1, Normal S2


Abdomen:  Normal bowel sounds, Soft, Other (epigastric tenderness to palpation)





Labs


Labs





Laboratory Tests








Test


 5/20/22


08:24


 


Bedside Urine HCG, Qualitative


 Hcg negative


(Negative)








Laboratory Tests








Test


 5/20/22


08:24


 


Bedside Urine HCG, Qualitative


 Hcg negative


(Negative)











Assessment/Plan


Assessment/Plan


epigastric abd pain- with change in bowel h abits, differential includes: IBS, 

IBD, celiac disease, colon/gastric cancer/polyps, GB disease, and/pr partial 

SBO. 


EGD and colonoscopy to further assess. R/B discussed with patient who is wiling 

to proceed.











MICHELINE MYERS MD             May 20, 2022 08:41

## 2022-05-23 NOTE — PATHOLOGY
[facility] Accession Number: [id number]

.                                                                01

Material submitted:                                        .

PART A: duodenum - DUODENAL BIOPSIES

PART B: colon - RANDOM COLON BIOPSIES

PART C: sigmoid colon - SIGMOID COLON POLYPECTOMY

.                                                                01

Clinical history:                                          .

ABD PAIN, BOWEL DISCOMFORT

.                                                                02

**********************************************************************

Diagnosis:

A. Duodenal biopsies:

- No significant pathologic abnormalities.

.

B. Colonic mucosa, random colon biopsies:

- No significant pathologic abnormalities.

.

C. Colonic mucosa, sigmoid colon polypectomy:

- Pedunculated tubular adenoma.

- Base of polyp lined by normal colonic mucosa.

(JPM/db; 5/23/2022)

[facility]  05/23/2022  1328 Local

**********************************************************************

.                                                                02

Comment:

Sections of the duodenal biopsy reveal multiple segments of duodenal and

small intestine mucosa.  Where best oriented, the mucosal villi show no

sprue-like changes or significant inflammatory changes.

.

Sections of the random colon biopsy reveal multiple segments of colonic

mucosa.  There is no evidence of a chronic destructive colitis,

lymphocytic colitis, or collagenous colitis.

.

Sections of the sigmoid colon polypectomy reveal a pedunculated tubular

adenoma showing no high grade dysplasia or evidence of malignancy.  The

base of the polyp is lined by normal colonic mucosa.

(JPM/db; 5/23/2022)

.                                                                02

Electronically signed:                                     .

David Garvin MD, Pathologist

NPI- [id number]

.                                                                01

Gross description:                                         .

A. The specimen is received in formalin, labeled "Natalie Samayoa, duodenal

biopsy". Received are multiple segments of pale tan tissue ranging in size

from 0.2-0.7 cm in the specimen is submitted entirely in cassette A1.

.

B. The specimen is received in formalin, labeled "Natalie Samyaoa, random

colon biopsies". Received are multiple segments of pale tan tissue ranging

in size from 0.3-0.6 cm in maximum dimensions. The specimen is submitted

entirely in cassette B1.

.

C. The specimen is received in formalin, labeled "Natalie Samayoa, sigmoid

colon polypectomy". Received is a segment of red-brown tissue measuring

0.7 x 0.6 x 0.4 cm in greatest dimensions. The surgical margin is inked.

The segment is bisected perpendicular to the margin and entirely submitted

in cassette C1.

(CAA; 5/20/2022)

[facility]  05/20/2022  1601 Local

.                                                                02

Pathologist provided ICD-10:

D12.5, R10.9

.                                                                02

CPT                                                        .

822531, 630350, [id number]

Specimen Comment: A courtesy copy of this report has been sent to [phone], [phone]

Specimen Comment: [id number]

Specimen Comment: Report sent to  / DR KIRK

Specimen Comment: A duplicate report has been generated due to demographic updates.

***Performed at:  01

   [facility]

   [address], [place]  [id number]

   MD Ozzy Sykes MD Phone:  [phone]

***Performed at:  02

   [facility]

   [address], [place]  [id number]

   MD David Garvin MD Phone:  [phone]